# Patient Record
Sex: MALE | Race: WHITE | Employment: FULL TIME | URBAN - METROPOLITAN AREA
[De-identification: names, ages, dates, MRNs, and addresses within clinical notes are randomized per-mention and may not be internally consistent; named-entity substitution may affect disease eponyms.]

---

## 2018-10-22 ENCOUNTER — THERAPY VISIT (OUTPATIENT)
Dept: PHYSICAL THERAPY | Facility: CLINIC | Age: 33
End: 2018-10-22
Payer: COMMERCIAL

## 2018-10-22 DIAGNOSIS — M25.562 BILATERAL KNEE PAIN: Primary | ICD-10-CM

## 2018-10-22 DIAGNOSIS — M25.561 BILATERAL KNEE PAIN: Primary | ICD-10-CM

## 2018-10-22 PROCEDURE — 97140 MANUAL THERAPY 1/> REGIONS: CPT | Mod: GP | Performed by: PHYSICAL THERAPIST

## 2018-10-22 PROCEDURE — 97161 PT EVAL LOW COMPLEX 20 MIN: CPT | Mod: GP | Performed by: PHYSICAL THERAPIST

## 2018-10-22 ASSESSMENT — ACTIVITIES OF DAILY LIVING (ADL)
GO DOWN STAIRS: ACTIVITY IS FAIRLY DIFFICULT
WALK: ACTIVITY IS FAIRLY DIFFICULT
STAND: ACTIVITY IS FAIRLY DIFFICULT
HOW_WOULD_YOU_RATE_THE_CURRENT_FUNCTION_OF_YOUR_KNEE_DURING_YOUR_USUAL_DAILY_ACTIVITIES_ON_A_SCALE_FROM_0_TO_100_WITH_100_BEING_YOUR_LEVEL_OF_KNEE_FUNCTION_PRIOR_TO_YOUR_INJURY_AND_0_BEING_THE_INABILITY_TO_PERFORM_ANY_OF_YOUR_USUAL_DAILY_ACTIVITIES?: 50
KNEE_ACTIVITY_OF_DAILY_LIVING_SUM: 21
SWELLING: THE SYMPTOM AFFECTS MY ACTIVITY MODERATELY
HOW_WOULD_YOU_RATE_THE_OVERALL_FUNCTION_OF_YOUR_KNEE_DURING_YOUR_USUAL_DAILY_ACTIVITIES?: ABNORMAL
STIFFNESS: THE SYMPTOM AFFECTS MY ACTIVITY SEVERELY
RISE FROM A CHAIR: ACTIVITY IS FAIRLY DIFFICULT
SIT WITH YOUR KNEE BENT: ACTIVITY IS VERY DIFFICULT
LIMPING: THE SYMPTOM AFFECTS MY ACTIVITY SEVERELY
WEAKNESS: THE SYMPTOM AFFECTS MY ACTIVITY SEVERELY
KNEE_ACTIVITY_OF_DAILY_LIVING_SCORE: 30
SQUAT: ACTIVITY IS VERY DIFFICULT
PAIN: THE SYMPTOM AFFECTS MY ACTIVITY SEVERELY
KNEEL ON THE FRONT OF YOUR KNEE: ACTIVITY IS VERY DIFFICULT
GO UP STAIRS: ACTIVITY IS FAIRLY DIFFICULT
GIVING WAY, BUCKLING OR SHIFTING OF KNEE: THE SYMPTOM AFFECTS MY ACTIVITY MODERATELY
RAW_SCORE: 21
AS_A_RESULT_OF_YOUR_KNEE_INJURY,_HOW_WOULD_YOU_RATE_YOUR_CURRENT_LEVEL_OF_DAILY_ACTIVITY?: ABNORMAL

## 2018-10-22 NOTE — PROGRESS NOTES
[facility] Initial Evaluation  Subjective:  Patient is a 33 year old male presenting with rehab right knee hpi. The history is provided by the patient. No  was used.   Ambrosio Caldwell is a 33 year old male with a bilateral knees condition.  Occurance: run and rucking.  Context: work.  This is a chronic condition  January 2008.    Site of Pain: general (B)  Radiates to: none.  Pain is described as stabbing, aching and sharp and is constant and reported as 6/10.  Associated symptoms:  Buckling/giving out, loss of motion/stiffness, loss of strength and edema. Worse during: none.  Symptoms are exacerbated by transfers, ascending stairs, bending/squatting, descending stairs, sitting, standing, walking and weight bearing and relieved by nothing.  Since onset symptoms are gradually worsening.  Special tests:  X-ray (osteochondritis patella (B)).  Previous treatment: none.  Improvement with previous treatment: none.  General health as reported by patient is good.                                              Objective:  System                                                Knee Evaluation:  ROM:    AROM      Extension:  Left: 3 wiht pain     Right:  3 with end range pain  Flexion: Left: 8-122 with pain     Right: 8-120 with pain   PROM      Extension: Left: 2 with pain     Right:  2 wtih end range pain           Ligament Testing:  Not Assessed                Special Tests: Not Assessed              fair gluteus vicki contraction (R) and poor (L), poor quadricep contraction (B) with increased knee pain, bilateral lateral patellar tilt and glide with increased patellar glide with quadriceps contraction, mmt: gluteus medius: 3+/5 (B), quadriceps: 4/5 with pain (B), Hamstrings: 4/5 (B)    General     ROS    Assessment/Plan:    Patient is a 33 year old male with both sides knee complaints.    Patient has the following significant findings with corresponding treatment plan.                 Diagnosis 1:  Osteochondritis Dissecans (B)  Pain -  hot/cold therapy, manual therapy, self management, education, directional preference exercise and home program  Decreased ROM/flexibility - manual therapy, therapeutic exercise, therapeutic activity and home program  Decreased strength - therapeutic exercise, therapeutic activities and home program  Impaired muscle performance - neuro re-education and home program  Decreased function - therapeutic activities and home program    Therapy Evaluation Codes:   1) History comprised of:   Personal factors that impact the plan of care:      None.    Comorbidity factors that impact the plan of care are:      None.     Medications impacting care: None.  2) Examination of Body Systems comprised of:   Body structures and functions that impact the plan of care:      Knee.   Activity limitations that impact the plan of care are:      Stairs.  3) Clinical presentation characteristics are:   Stable/Uncomplicated.  4) Decision-Making    Low complexity using standardized patient assessment instrument and/or measureable assessment of functional outcome.  Cumulative Therapy Evaluation is: Low complexity.    Previous and current functional limitations:  (See Goal Flow Sheet for this information)    Short term and Long term goals: (See Goal Flow Sheet for this information)     Communication ability:  Patient appears to be able to clearly communicate and understand verbal and written communication and follow directions correctly.  Treatment Explanation - The following has been discussed with the patient:   RX ordered/plan of care  Anticipated outcomes  Possible risks and side effects  This patient would benefit from PT intervention to resume normal activities.   Rehab potential is excellent.    Frequency:  1 X week, once daily  Duration:  for 8 weeks  Discharge Plan:  Achieve all LTG.  Independent in home treatment program.  Reach maximal therapeutic benefit.    Please refer to the  daily flowsheet for treatment today, total treatment time and time spent performing 1:1 timed codes.

## 2018-10-22 NOTE — MR AVS SNAPSHOT
After Visit Summary   10/22/2018    Ambrosio Caldwell    MRN: [id number]           Patient Information     Date Of Birth          1985        Visit Information        Provider Department      10/22/2018 1:00 PM Nilsa Marquez PT [facility] [place] [facility]        Today's Diagnoses     Bilateral knee pain    -  1       Follow-ups after your visit        Your next 10 appointments already scheduled     Oct 29, 2018  7:40 AM CDT   FABRICIO Extremity with Nilsa Marquez PT   [facility] [place] [facility] ([facility])    [address] [place] [address]   [phone]            Nov 05, 2018  7:40 AM CST   FABRICIO Extremity with Nilsa Marquez PT   [facility] [place] [facility] ([facility])    [address] [place] [address] [phone]            Nov 12, 2018  7:40 AM CST   FABRICIO Extremity with Nilsa Marquez PT   [facility] [place] [facility] ([facility])    [address] [place] [address] [phone]            Nov 19, 2018  7:40 AM CST   FABRICIO Extremity with Nilsa Marquez PT   [facility] [place] [facility] ([facility])    [address] [place] [address] [phone]            Nov 26, 2018  7:40 AM CST   FABRICIO Extremity with Nilsa Marquez PT   [facility] [place] [facility] ([facility])    [address] [place] [address] [phone]              Who to contact     If you have questions or need follow up information about today's clinic visit or your schedule please contact [facility] directly at [phone].  Normal or non-critical lab and imaging results will be communicated to you by MyChart, letter or phone within 4 business days after the clinic has received the results. If you do not hear from us  "within 7 days, please contact the clinic through "[employer] or phone. If you have a critical or abnormal lab result, we will notify you by phone as soon as possible.  Submit refill requests through "[employer] or call your pharmacy and they will forward the refill request to us. Please allow 3 business days for your refill to be completed.          Additional Information About Your Visit        [employer] Information     "[employer] lets you send messages to your doctor, view your test results, renew your prescriptions, schedule appointments and more. To sign up, go to [email or web address] . Click on \"Log in\" on the left side of the screen, which will take you to the Welcome page. Then click on \"Sign up Now\" on the right side of the page.     You will be asked to enter the access code listed below, as well as some personal information. Please follow the directions to create your username and password.     Your access code is: [email or web address]  Expires: 2019  4:52 PM     Your access code will  in 90 days. If you need help or a new code, please call your [place] clinic or [phone].        Care EveryWhere ID     This is your Care EveryWhere ID. This could be used by other organizations to access your [place] medical records  [id number]         Blood Pressure from Last 3 Encounters:   No data found for BP    Weight from Last 3 Encounters:   No data found for Wt              We Performed the Following     Manual Ther Tech, 1+Regions, EA 15 min     PT Eval, Low Complexity ([id number])        Primary Care Provider Fax #    Physician No Ref-Primary [phone]       No address on file        Equal Access to Services     [facility] : Hadii aad ku hadasho Soomaali, waaxda luqadaha, qaybta kaalmada caitlin garcía . So [facility] [phone].    ATENCIÓN: Si habla español, tiene a cleaning disposición servicios gratuitos de asistencia lingüística. Llame al [phone].    We comply with applicable " federal civil rights laws and [place] laws. We do not discriminate on the basis of race, color, national origin, age, disability, sex, sexual orientation, or gender identity.            Thank you!     Thank you for choosing [facility] [place]  for your care. Our goal is always to provide you with excellent care. Hearing back from our patients is one way we can continue to improve our services. Please take a few minutes to complete the written survey that you may receive in the mail after your visit with us. Thank you!             Your Updated Medication List - Protect others around you: Learn how to safely use, store and throw away your medicines at www.disposemymeds.org.      Notice  As of 10/22/2018 11:59 PM    You have not been prescribed any medications.

## 2018-10-23 PROBLEM — M25.561 BILATERAL KNEE PAIN: Status: ACTIVE | Noted: 2018-10-23

## 2018-10-23 PROBLEM — M25.562 BILATERAL KNEE PAIN: Status: ACTIVE | Noted: 2018-10-23

## 2018-10-25 NOTE — PROGRESS NOTES
[place] [facility] Initial Evaluation  Subjective:  Patient is a 33 year old male presenting with rehab right knee hpi.                                      Pertinent medical history includes:  Concussions/dizziness, depression, mental illness, migraines/headaches, numbness/tingling, sleep disorder/apnea and smoking.    Other surgeries include:  Other.    Current occupation is .    Primary job tasks include:  Driving, lifting, operating a machine, prolonged standing, prolonged sitting and repetitive tasks.        Red flags:  Calf pain, swelling, warmth, significant weakness, pain at night/rest and severe dizziness.           Knee Activity of Daily Living Score: 30            Objective:  System    Physical Exam    General     ROS    Assessment/Plan:

## 2018-10-29 ENCOUNTER — THERAPY VISIT (OUTPATIENT)
Dept: PHYSICAL THERAPY | Facility: CLINIC | Age: 33
End: 2018-10-29
Payer: COMMERCIAL

## 2018-10-29 DIAGNOSIS — M25.562 BILATERAL KNEE PAIN: Primary | ICD-10-CM

## 2018-10-29 DIAGNOSIS — M25.561 BILATERAL KNEE PAIN: Primary | ICD-10-CM

## 2018-10-29 PROCEDURE — 97112 NEUROMUSCULAR REEDUCATION: CPT | Mod: GP | Performed by: PHYSICAL THERAPIST

## 2018-10-29 PROCEDURE — 97140 MANUAL THERAPY 1/> REGIONS: CPT | Mod: GP | Performed by: PHYSICAL THERAPIST

## 2018-11-05 ENCOUNTER — THERAPY VISIT (OUTPATIENT)
Dept: PHYSICAL THERAPY | Facility: CLINIC | Age: 33
End: 2018-11-05
Payer: COMMERCIAL

## 2018-11-05 DIAGNOSIS — M25.561 BILATERAL KNEE PAIN: Primary | ICD-10-CM

## 2018-11-05 DIAGNOSIS — M25.562 BILATERAL KNEE PAIN: Primary | ICD-10-CM

## 2018-11-05 PROCEDURE — 97140 MANUAL THERAPY 1/> REGIONS: CPT | Mod: GP | Performed by: PHYSICAL THERAPIST

## 2018-11-05 PROCEDURE — 97112 NEUROMUSCULAR REEDUCATION: CPT | Mod: GP | Performed by: PHYSICAL THERAPIST

## 2018-11-19 ENCOUNTER — THERAPY VISIT (OUTPATIENT)
Dept: PHYSICAL THERAPY | Facility: CLINIC | Age: 33
End: 2018-11-19
Payer: COMMERCIAL

## 2018-11-19 DIAGNOSIS — M25.562 BILATERAL KNEE PAIN: Primary | ICD-10-CM

## 2018-11-19 DIAGNOSIS — M25.561 BILATERAL KNEE PAIN: Primary | ICD-10-CM

## 2018-11-19 PROCEDURE — 97140 MANUAL THERAPY 1/> REGIONS: CPT | Mod: GP | Performed by: PHYSICAL THERAPIST

## 2018-11-19 PROCEDURE — 97112 NEUROMUSCULAR REEDUCATION: CPT | Mod: GP | Performed by: PHYSICAL THERAPIST

## 2018-11-26 ENCOUNTER — THERAPY VISIT (OUTPATIENT)
Dept: PHYSICAL THERAPY | Facility: CLINIC | Age: 33
End: 2018-11-26
Payer: COMMERCIAL

## 2018-11-26 DIAGNOSIS — M25.561 BILATERAL KNEE PAIN: Primary | ICD-10-CM

## 2018-11-26 DIAGNOSIS — M25.562 BILATERAL KNEE PAIN: Primary | ICD-10-CM

## 2018-11-26 PROCEDURE — 97140 MANUAL THERAPY 1/> REGIONS: CPT | Mod: GP | Performed by: PHYSICAL THERAPIST

## 2020-07-10 ENCOUNTER — HOSPITAL ENCOUNTER (EMERGENCY)
Facility: CLINIC | Age: 35
Discharge: HOME | End: 2020-07-10
Attending: PHYSICIAN ASSISTANT | Admitting: PHYSICIAN ASSISTANT
Payer: COMMERCIAL

## 2020-07-10 VITALS
HEART RATE: 89 BPM | SYSTOLIC BLOOD PRESSURE: 152 MMHG | TEMPERATURE: 99.1 F | OXYGEN SATURATION: 99 % | WEIGHT: 200 LBS | DIASTOLIC BLOOD PRESSURE: 109 MMHG | RESPIRATION RATE: 16 BRPM

## 2020-07-10 DIAGNOSIS — K04.7 DENTAL ABSCESS: ICD-10-CM

## 2020-07-10 PROCEDURE — 99282 EMERGENCY DEPT VISIT SF MDM: CPT | Performed by: PHYSICIAN ASSISTANT

## 2020-07-10 PROCEDURE — 99284 EMERGENCY DEPT VISIT MOD MDM: CPT | Mod: Z6 | Performed by: PHYSICIAN ASSISTANT

## 2020-07-10 RX ORDER — CLINDAMYCIN HCL 300 MG
300 CAPSULE ORAL 4 TIMES DAILY
Qty: 40 CAPSULE | Refills: 0 | Status: SHIPPED | OUTPATIENT
Start: 2020-07-10 | End: 2020-07-20

## 2020-07-10 RX ORDER — IBUPROFEN 600 MG/1
600 TABLET, FILM COATED ORAL EVERY 6 HOURS PRN
Qty: 20 TABLET | Refills: 0 | Status: SHIPPED | OUTPATIENT
Start: 2020-07-10

## 2020-07-10 RX ORDER — HYDROCODONE BITARTRATE AND ACETAMINOPHEN 5; 325 MG/1; MG/1
1 TABLET ORAL EVERY 6 HOURS PRN
Qty: 8 TABLET | Refills: 0 | Status: SHIPPED | OUTPATIENT
Start: 2020-07-10 | End: 2020-07-13

## 2020-07-10 NOTE — ED AVS SNAPSHOT
[facility] Emergency Department  [address] [place] [address]  Phone:  [phone]  Fax:  [phone]                                    Ambrosio Caldwell   MRN: [id number]    Department:  [facility] Emergency Department   Date of Visit:  7/10/2020           After Visit Summary Signature Page    I have received my discharge instructions, and my questions have been answered. I have discussed any challenges I see with this plan with the nurse or doctor.    ..........................................................................................................................................  Patient/Patient Representative Signature      ..........................................................................................................................................  Patient Representative Print Name and Relationship to Patient    ..................................................               ................................................  Date                                   Time    ..........................................................................................................................................  Reviewed by Signature/Title    ...................................................              ..............................................  Date                                               Time          22EPIC Rev 08/18        1 person assist/verbal cues/set-up required

## 2020-08-19 ENCOUNTER — HOSPITAL ENCOUNTER (EMERGENCY)
Facility: CLINIC | Age: 35
Discharge: HOME | End: 2020-08-19
Attending: EMERGENCY MEDICINE | Admitting: EMERGENCY MEDICINE
Payer: COMMERCIAL

## 2020-08-19 VITALS
OXYGEN SATURATION: 98 % | HEART RATE: 94 BPM | SYSTOLIC BLOOD PRESSURE: 142 MMHG | RESPIRATION RATE: 18 BRPM | TEMPERATURE: 98.4 F | DIASTOLIC BLOOD PRESSURE: 93 MMHG

## 2020-08-19 DIAGNOSIS — K04.7 DENTAL ABSCESS: ICD-10-CM

## 2020-08-19 DIAGNOSIS — K08.89 PAIN, DENTAL: ICD-10-CM

## 2020-08-19 PROCEDURE — 99282 EMERGENCY DEPT VISIT SF MDM: CPT | Performed by: EMERGENCY MEDICINE

## 2020-08-19 PROCEDURE — 99284 EMERGENCY DEPT VISIT MOD MDM: CPT | Mod: Z6 | Performed by: EMERGENCY MEDICINE

## 2020-08-19 RX ORDER — HYDROCODONE BITARTRATE AND ACETAMINOPHEN 5; 325 MG/1; MG/1
1 TABLET ORAL EVERY 6 HOURS PRN
Qty: 12 TABLET | Refills: 0 | Status: SHIPPED | OUTPATIENT
Start: 2020-08-19 | End: 2020-08-22

## 2020-08-19 RX ORDER — CLINDAMYCIN HCL 300 MG
300 CAPSULE ORAL 4 TIMES DAILY
Qty: 40 CAPSULE | Refills: 0 | Status: SHIPPED | OUTPATIENT
Start: 2020-08-19 | End: 2020-08-29

## 2020-08-19 NOTE — ED TRIAGE NOTES
Patient having discomfort on left lower of mouth. He notice lumps back by teeth. This started yesterday.

## 2020-08-19 NOTE — DISCHARGE INSTRUCTIONS
Start antibiotics immediately.  Finish entire course even if you begin to feel better    You may take Tylenol or ibuprofen per bottle instructions as needed for mild to moderate pain.  If you have more severe pain, or pain that is not controlled with over-the-counter medications, you may take a tablet of Norco.  Be aware that Norco may make you drowsy, do not drive or drink alcohol while taking this medicine.  Also be aware that Norco has Tylenol in it, do not take more than 4000 mg of Tylenol in a 24-hour period.    Follow-up with 1 of the dentist on the list provided.  You may need to have 1 or more teeth pulled    Return to the ER if you have any new or worsening symptoms, if you feel that the swelling is getting worse, you have difficulty swallowing or difficulty breathing, or are not improving on the antibiotic therapy

## 2020-08-19 NOTE — ED PROVIDER NOTES
History     Chief Complaint   Patient presents with     Dental Pain     HPI  Ambrosio Caldwell is a 35 year old male who presents to the ER with dental pain.  It is the left lower jaw, but says that he occasionally notes it on the right lower jaw.  He says that he noticed some lumps by the back of his teeth on the lower side.  All of this started yesterday.  Feels very similar to previous bouts of dental abscesses that he has dealt with in the past.  Previously has been put on antibiotics which helped with his symptoms.  He does not regularly see a dentist since [facility].  He has not been to see a dentist for this most recent pain.  Has not been running a fever, has not had any difficulty swallowing, no pain with opening his jaw or in his ear, no neck pain.  He did not take any medication for the pain today.    Allergies:  No Known Allergies    Problem List:    Patient Active Problem List    Diagnosis Date Noted     Bilateral knee pain 10/23/2018     Priority: Medium        Past Medical History:    No past medical history on file.    Past Surgical History:    No past surgical history on file.    Family History:    No family history on file.    Social History:  Marital Status:   [2]  Social History     Tobacco Use     Smoking status: Not on file   Substance Use Topics     Alcohol use: Not on file     Drug use: Not on file        Medications:    clindamycin (CLEOCIN) 300 MG capsule  HYDROcodone-acetaminophen (NORCO) 5-325 MG tablet  ibuprofen (ADVIL/MOTRIN) 600 MG tablet          Review of Systems   All other systems reviewed and are negative.      Physical Exam   BP: (!) 142/93  Pulse: 94  Temp: 98.4  F (36.9  C)  Resp: 18  SpO2: 98 %      Physical Exam  Vitals signs and nursing note reviewed.   Constitutional:       General: He is not in acute distress.     Appearance: He is not diaphoretic.   HENT:      Head: Atraumatic.      Right Ear: Tympanic membrane normal.       Left Ear: Tympanic membrane normal.      Nose: Nose normal.      Mouth/Throat:      Mouth: Mucous membranes are moist.      Pharynx: No oropharyngeal exudate.        Comments: Poor dentition, decaying teeth, no gingival swelling to indicate abscess that would be amenable to incision and drainage  Eyes:      General: No scleral icterus.     Pupils: Pupils are equal, round, and reactive to light.   Cardiovascular:      Heart sounds: Normal heart sounds.   Pulmonary:      Effort: No respiratory distress.      Breath sounds: Normal breath sounds.   Abdominal:      General: Bowel sounds are normal.      Palpations: Abdomen is soft.      Tenderness: There is no abdominal tenderness.   Musculoskeletal:         General: No tenderness.   Lymphadenopathy:      Cervical: Cervical adenopathy present.   Skin:     General: Skin is warm.      Findings: No rash.   Neurological:      Mental Status: He is alert.         ED Course        Procedures               Critical Care time:  none               No results found for this or any previous visit (from the past 24 hour(s)).    Medications - No data to display    Assessments & Plan (with Medical Decision Making)    Ambrosio is a 35-year-old male who presents to the ER with dental pain.  It is worse in the left lower jaw and started yesterday.  He says this is similar to previous dental abscess that he has dealt with in the past.  See history and focused physical exam as above  No evidence of abscess that would be amenable to incision and drainage.  Patient does have poor dentition.  Offered a dental injection to help with the tooth pain.  He declined.  He really just wants antibiotics and pain medication since that is what helped him last time.  We will send prescriptions of these to the pharmacy.  Also provided with a list of dental clinics that he should follow-up closely with since he does not have a dentist.  They may wish to pull some teeth due to his poor dentition and pain.  He  understands and is agreeable with this plan.  Discussed reasons to return to the ER, especially if he has any worsening pain or swelling despite being on the antibiotics and pain medication, if he is running a fever, or if he has any difficulty swallowing or breathing.  He understands and agrees and is discharged in no acute distress     I have reviewed the nursing notes.    I have reviewed the findings, diagnosis, plan and need for follow up with the patient.       Discharge Medication List as of 8/19/2020  4:26 PM      START taking these medications    Details   clindamycin (CLEOCIN) 300 MG capsule Take 1 capsule (300 mg) by mouth 4 times daily for 10 days, Disp-40 capsule,R-0, E-Prescribe      HYDROcodone-acetaminophen (NORCO) 5-325 MG tablet Take 1 tablet by mouth every 6 hours as needed for severe pain, Disp-12 tablet,R-0, Local Print             Final diagnoses:   Pain, dental   Dental abscess       8/19/2020   [facility] EMERGENCY DEPARTMENT     Deanne Barbosa DO  08/19/20 [id number]

## 2020-08-19 NOTE — ED AVS SNAPSHOT
[facility] Emergency Department  [address] [place] [address]  Phone:  [phone]  Fax:  [phone]                                    Ambrosio Caldwell   MRN: [id number]    Department:  [facility] Emergency Department   Date of Visit:  8/19/2020           After Visit Summary Signature Page    I have received my discharge instructions, and my questions have been answered. I have discussed any challenges I see with this plan with the nurse or doctor.    ..........................................................................................................................................  Patient/Patient Representative Signature      ..........................................................................................................................................  Patient Representative Print Name and Relationship to Patient    ..................................................               ................................................  Date                                   Time    ..........................................................................................................................................  Reviewed by Signature/Title    ...................................................              ..............................................  Date                                               Time          22EPIC Rev 08/18